# Patient Record
Sex: FEMALE | Race: WHITE
[De-identification: names, ages, dates, MRNs, and addresses within clinical notes are randomized per-mention and may not be internally consistent; named-entity substitution may affect disease eponyms.]

---

## 2022-02-06 NOTE — NUR
spoke with Robin,NP and pt has been accepted for admission to 
tele.

Tele floor and nursing supervisor notified for tele bed, none available at this 
time.

Pt resting in gurney and watching TV with NAD noted at this time.

## 2022-02-06 NOTE — NUR
ADMITTED PATIENT ON TELE FLOOR UNDER THE CARE ERASMO NP, PATIENT ALERT BUT FORGETFUL SPEAK 
FARSI, BUT UNDERSTAND ENGLISH, PATIENT COMPLAIN OF HEADACHES, AND RIGHT HIP PAIN. PATIENT 
WANTED TO WALK BUT NOTED WITH BILATERAL LEG WEAK UNABLE TO STAND UP, AND WALK.  BED ALARM 
WAS PUT ON FOR SAFETY.

## 2022-02-07 NOTE — NUR
PATIENT HAS ANXIETY MULTIPLE EPISODES, TAKES LOTS OF PERSUASION BEFORE LAB DRAW, PATIENT HAS 
MULTIPLE DEMANDS, ATTENDS PATIENT NEEDS, ANSWER HER CALL LIGHT ASAP, CONT TO MONITOR.

## 2022-02-07 NOTE — NUR
PATIENT IV LINE INFILTRATED, TRIED TO PUT A NEW ONE, PATIENT GETS AGITATED, PULLED HANDS 
AWAY, YELLS AND SCREAMS, REFUSED IV AT THIS TIME, WILL TRY AGAIN LATER.

## 2022-02-07 NOTE — NUR
PATIENT ASLEEP BUT AROUSABLE, NO SOB NO CHEST PAIN, NO COMPLAIN OF PAIN AT THIS TIME. 
PATIENT CONTINUE TO REFUSE TELE MONITOR BOX, REFUSED IV LINES, PATIENT GETS AGITATED WHEN 
TRIED TO INSERT IV LINES, PATIENT WAS GIVEN XANAX FOR ANXIETY WITH SOME HELP, WITH NO 
ADVERSE REACTION NOTED.

## 2022-02-08 NOTE — NUR
Received pt awake, alert and orientedx2. IV intact. Pt in no acute distress. Safety and 
comfort provided. Will continue to monitor.

## 2022-02-08 NOTE — NUR
RECEIVED PATIENT IN BED AWAKE ALERT AND VERBALLY RESPONSIVE NO SS OF DISTRESS OR ACUTE PAIN. 
OBSERVE

## 2022-02-08 NOTE — NUR
PATIENT ASLEEP BUT AROUSABLE, NO ADVERSE CHANGES NOTED, V/S STABLE, KEPT CLEAN AND DRY, 
COMPLAIN OF HEADACHES GIVEN TYLENOL WITH HELP AFTER ONE HOUR. CONT TO MONITOR.

## 2022-02-09 NOTE — NUR
The patient remained stable during the shift. no distress identified. Tolerated the PT eval. 
all needs attended, due meds given. kept call light within reach. will endorse to the next 
shift for continuity of care.

## 2022-02-09 NOTE — NUR
Received pt lying in bed, resting. Alert and oriented X 2/3. Sometimes forgetful. Able to 
state needs. On RA saturating at 98%. No signs of acute distress. Fall precautions 
initiated. Safety measures implemented. Call lights within reach.

## 2022-02-09 NOTE — NUR
Tylenol 650mg prn given at 2314h for headache. Pt tolerated it well. Pt requesting for 
sleeping medication. Administered Ambien 5mg prn at 0117h. Medication effective as pt is 
sleeping after 40 minutes. . Pt in no acute distress. Will continue to monitor.

## 2022-02-09 NOTE — NUR
Pt slept intermittently. Pt in no acute distress. Pt have episodes of confusion. Needs 
reorientation. Prescribed medication given and pt tolerated it well. Safety and comfort 
provided. Will endorse to incoming nurse for continuity of care.

## 2022-02-10 NOTE — NUR
PT REFUSED ENOXAPRIN INJFECTION. INFORMED AND EDUCATED PT ON RISKS AND BENEFITS OF 
MEDICATION, CONTINUED TO REFUSE. STATES "SHOTS GIVES ME A HEADACHE, LAST NIGHT I COULD NOT 
SLEEP BECAUSE OF THAT AND I AM GOING HOME TOMORROW". no signs of acute distress.

## 2022-02-10 NOTE — NUR
Received pt lying in bed. AO x 2, sometimes forgetful. Able to make needs known. On room air 
saturating at 96%. No signs of acute distress. Safety measures initiated. Call lights within 
reach.

## 2022-02-10 NOTE — NUR
Patient currently resting comfortably in wheel chair in locked position.  Patient received 
care well today along with medication regimen and PT.  Patient has no IV site due to patient 
refusal.  Patient receiving PO meds well. Will endorse information to PM nurse.

## 2022-02-10 NOTE — NUR
Pt sleeping comfortably throughout the night with no signs of distress. On room air 
saturating at 97%. Compliant with medication and care. No complaints at this time. Needs 
have been met. Call lights within reach. Safety measures maintained.

## 2022-02-10 NOTE — NUR
2216: Tylenol 650 mg PO given for headache. 2350 Pt c/o 8/10 back pain, Norco 5-325 mg PO 
given. Both effective, pt sleeping comfortably and easily arousable. No signs of acute 
distress.

## 2022-02-11 NOTE — NUR
Ambien PO given for sleep. Patient slept through the night. Easily arousable. On room air 
saturating at 96%. No signs of acute distress. Call lights within reach. Safety measures 
maintained.

## 2022-02-11 NOTE — NUR
patient discharged to [facility], report given to Noy BETTS, Patient is stable, 
alert, oriented x4, no sob, resp even nonlabored, skin warm and dry to touch, belongings are 
accounted and signed, sent with patient. ID removed, no IV access on patient.

## 2022-05-01 ENCOUNTER — HOSPITAL ENCOUNTER (EMERGENCY)
Dept: HOSPITAL 12 - ER | Age: 82
Discharge: NURSING, REHAB OR LONG-TERM CARE | End: 2022-05-01
Payer: MEDICARE

## 2022-05-01 VITALS — BODY MASS INDEX: 24.54 KG/M2 | WEIGHT: 125 LBS | HEIGHT: 60 IN

## 2022-05-01 VITALS — SYSTOLIC BLOOD PRESSURE: 154 MMHG | DIASTOLIC BLOOD PRESSURE: 77 MMHG

## 2022-05-01 DIAGNOSIS — R26.2: ICD-10-CM

## 2022-05-01 DIAGNOSIS — Z91.81: ICD-10-CM

## 2022-05-01 DIAGNOSIS — M25.561: ICD-10-CM

## 2022-05-01 DIAGNOSIS — M25.551: ICD-10-CM

## 2022-05-01 DIAGNOSIS — I25.10: ICD-10-CM

## 2022-05-01 DIAGNOSIS — I10: ICD-10-CM

## 2022-05-01 DIAGNOSIS — H40.9: ICD-10-CM

## 2022-05-01 DIAGNOSIS — M25.562: Primary | ICD-10-CM

## 2022-05-01 DIAGNOSIS — M17.0: ICD-10-CM

## 2022-05-01 DIAGNOSIS — G89.29: ICD-10-CM

## 2022-05-01 DIAGNOSIS — Z79.899: ICD-10-CM

## 2022-05-01 PROCEDURE — 72190 X-RAY EXAM OF PELVIS: CPT

## 2022-05-01 PROCEDURE — 73564 X-RAY EXAM KNEE 4 OR MORE: CPT

## 2022-05-01 PROCEDURE — A4663 DIALYSIS BLOOD PRESSURE CUFF: HCPCS

## 2022-05-01 PROCEDURE — 99284 EMERGENCY DEPT VISIT MOD MDM: CPT

## 2022-05-01 PROCEDURE — 96372 THER/PROPH/DIAG INJ SC/IM: CPT

## 2022-05-01 NOTE — NUR
Patient discharged to home in stable condition.  Written and verbal after care 
instructions given. 

Patient verbalizes understanding of instructions. Stressed follow up or return 
to ER for worsening s/s. Pt in stable condition. Denies pain at this time.

## 2022-05-01 NOTE — NUR
called eugenia nurse supervisor at the [facility] center on [facility] pt to go back to 
the nursing home by the same ambulance that brought her to [facility]  which is 
[facility]

## 2022-05-01 NOTE — NUR
call to [facility] spoke with Danielle the eta is 0500 to 
take the pt back to her nursing home.